# Patient Record
Sex: FEMALE | Race: WHITE | NOT HISPANIC OR LATINO | Employment: UNEMPLOYED | URBAN - METROPOLITAN AREA
[De-identification: names, ages, dates, MRNs, and addresses within clinical notes are randomized per-mention and may not be internally consistent; named-entity substitution may affect disease eponyms.]

---

## 2018-06-29 ENCOUNTER — HOSPITAL ENCOUNTER (EMERGENCY)
Facility: HOSPITAL | Age: 33
Discharge: HOME | End: 2018-06-29
Attending: EMERGENCY MEDICINE

## 2018-06-29 VITALS
SYSTOLIC BLOOD PRESSURE: 106 MMHG | HEIGHT: 64 IN | OXYGEN SATURATION: 96 % | TEMPERATURE: 98 F | HEART RATE: 53 BPM | RESPIRATION RATE: 18 BRPM | BODY MASS INDEX: 21.34 KG/M2 | DIASTOLIC BLOOD PRESSURE: 60 MMHG | WEIGHT: 125 LBS

## 2018-06-29 DIAGNOSIS — R10.10 UPPER ABDOMINAL PAIN: Primary | ICD-10-CM

## 2018-06-29 DIAGNOSIS — E86.0 DEHYDRATION: ICD-10-CM

## 2018-06-29 LAB
ALBUMIN SERPL BCP-MCNC: 4.3 G/DL
ALP SERPL-CCNC: 76 U/L
ALT SERPL W/O P-5'-P-CCNC: 33 U/L
ANION GAP SERPL CALC-SCNC: 11 MMOL/L
AST SERPL-CCNC: 34 U/L
B-HCG UR QL: NEGATIVE
BASOPHILS # BLD AUTO: 0.03 K/UL
BASOPHILS NFR BLD: 0.3 %
BILIRUB SERPL-MCNC: 0.4 MG/DL
BILIRUB UR QL STRIP: NEGATIVE
BUN SERPL-MCNC: 11 MG/DL
CALCIUM SERPL-MCNC: 9.7 MG/DL
CHLORIDE SERPL-SCNC: 103 MMOL/L
CLARITY UR: CLEAR
CO2 SERPL-SCNC: 25 MMOL/L
COLOR UR: YELLOW
CREAT SERPL-MCNC: 0.8 MG/DL
CTP QC/QA: YES
DIFFERENTIAL METHOD: NORMAL
EOSINOPHIL # BLD AUTO: 0.1 K/UL
EOSINOPHIL NFR BLD: 0.5 %
ERYTHROCYTE [DISTWIDTH] IN BLOOD BY AUTOMATED COUNT: 13.3 %
EST. GFR  (AFRICAN AMERICAN): >60 ML/MIN/1.73 M^2
EST. GFR  (NON AFRICAN AMERICAN): >60 ML/MIN/1.73 M^2
GLUCOSE SERPL-MCNC: 86 MG/DL
GLUCOSE UR QL STRIP: NEGATIVE
HCT VFR BLD AUTO: 39.5 %
HGB BLD-MCNC: 13.5 G/DL
HGB UR QL STRIP: NEGATIVE
KETONES UR QL STRIP: ABNORMAL
LEUKOCYTE ESTERASE UR QL STRIP: NEGATIVE
LIPASE SERPL-CCNC: 22 U/L
LYMPHOCYTES # BLD AUTO: 3.1 K/UL
LYMPHOCYTES NFR BLD: 32.8 %
MCH RBC QN AUTO: 30.1 PG
MCHC RBC AUTO-ENTMCNC: 34.2 G/DL
MCV RBC AUTO: 88 FL
MONOCYTES # BLD AUTO: 1 K/UL
MONOCYTES NFR BLD: 10.7 %
NEUTROPHILS # BLD AUTO: 5.2 K/UL
NEUTROPHILS NFR BLD: 55.6 %
NITRITE UR QL STRIP: NEGATIVE
PH UR STRIP: 6 [PH] (ref 5–8)
PLATELET # BLD AUTO: 301 K/UL
PMV BLD AUTO: 10.9 FL
POTASSIUM SERPL-SCNC: 3.8 MMOL/L
PROT SERPL-MCNC: 7.4 G/DL
PROT UR QL STRIP: NEGATIVE
RBC # BLD AUTO: 4.48 M/UL
SODIUM SERPL-SCNC: 139 MMOL/L
SP GR UR STRIP: 1.02 (ref 1–1.03)
URN SPEC COLLECT METH UR: ABNORMAL
UROBILINOGEN UR STRIP-ACNC: NEGATIVE EU/DL
WBC # BLD AUTO: 9.44 K/UL

## 2018-06-29 PROCEDURE — 96361 HYDRATE IV INFUSION ADD-ON: CPT

## 2018-06-29 PROCEDURE — 80053 COMPREHEN METABOLIC PANEL: CPT

## 2018-06-29 PROCEDURE — 85025 COMPLETE CBC W/AUTO DIFF WBC: CPT

## 2018-06-29 PROCEDURE — 96365 THER/PROPH/DIAG IV INF INIT: CPT

## 2018-06-29 PROCEDURE — 83690 ASSAY OF LIPASE: CPT

## 2018-06-29 PROCEDURE — 99283 EMERGENCY DEPT VISIT LOW MDM: CPT | Mod: 25

## 2018-06-29 PROCEDURE — 81003 URINALYSIS AUTO W/O SCOPE: CPT

## 2018-06-29 PROCEDURE — 81025 URINE PREGNANCY TEST: CPT | Performed by: PHYSICIAN ASSISTANT

## 2018-06-29 PROCEDURE — 25000003 PHARM REV CODE 250: Performed by: PHYSICIAN ASSISTANT

## 2018-06-29 PROCEDURE — C9113 INJ PANTOPRAZOLE SODIUM, VIA: HCPCS | Performed by: PHYSICIAN ASSISTANT

## 2018-06-29 PROCEDURE — 63600175 PHARM REV CODE 636 W HCPCS: Performed by: PHYSICIAN ASSISTANT

## 2018-06-29 RX ORDER — ONDANSETRON 4 MG/1
4 TABLET, ORALLY DISINTEGRATING ORAL EVERY 6 HOURS PRN
Qty: 20 TABLET | Refills: 0 | Status: ON HOLD | OUTPATIENT
Start: 2018-06-29 | End: 2020-07-08 | Stop reason: HOSPADM

## 2018-06-29 RX ORDER — FAMOTIDINE 20 MG/1
20 TABLET, FILM COATED ORAL 2 TIMES DAILY
Qty: 60 TABLET | Refills: 0 | Status: ON HOLD | OUTPATIENT
Start: 2018-06-29 | End: 2020-07-08 | Stop reason: HOSPADM

## 2018-06-29 RX ADMIN — DEXTROSE 40 MG: 50 INJECTION, SOLUTION INTRAVENOUS at 06:06

## 2018-06-29 RX ADMIN — SODIUM CHLORIDE 1000 ML: 0.9 INJECTION, SOLUTION INTRAVENOUS at 05:06

## 2018-06-29 RX ADMIN — SODIUM CHLORIDE 1000 ML: 0.9 INJECTION, SOLUTION INTRAVENOUS at 03:06

## 2018-06-29 NOTE — ED TRIAGE NOTES
Pt states she was out drinking last night and believes she has alcohol poisoning. She feels like she is moving, even though she is being still, like sea sickness. Reports having chest pains earlier today and feels shaky. Complains of headache, nausea, vomiting and stomach ache. Denies diarrhea.

## 2018-06-29 NOTE — DISCHARGE INSTRUCTIONS
Drink lots of fluids, stay well hydrated. Follow-up with your primary for reevaluation. Return to this ED if unable to tolerate by mouth intake, if pain worsens, if any other problems occur.

## 2018-07-01 NOTE — ED PROVIDER NOTES
"Encounter Date: 6/29/2018       History     Chief Complaint   Patient presents with    Alcohol Intoxication     "i think I got alcohol poisoning. I started drinking yesterday. [place] saphire gin." complains of right arm numbness     32-year-old female past medical history anxiety presents to ED complaining of suspicion for alcohol intoxication.  Patient states she drank excessively yesterday.  She woke this morning complaining of feeling jittery, lightheaded. Denies CP, palpitations, or SOB.  She admits to mild upper abdominal pain, she also endorses nausea with multiple episodes nonbloody, nonbilious emesis.  Patient denies fever or chills.  She denies change in bowel or bladder habits.  No urinary complaints.  No vaginal complaints. Symptoms constant. No alleviating/exacerbating factors. No radiation.           Review of patient's allergies indicates:   Allergen Reactions    Risperidone analogues Other (See Comments)     Blurred vision and drooling.     Past Medical History:   Diagnosis Date    Anxiety     Ectopic pregnancy     x2    PID (acute pelvic inflammatory disease) age 16     Past Surgical History:   Procedure Laterality Date    EYE SURGERY      LAPAROSCOPY-DIAGNOSTIC  April 4,2014    Ruptured left ectopic- left salpingectomy with lysis of adhesions    Left salpingectomy  April 4,2014    Left ruptured ectopic pregnancy    Right salpingectomy  age 21     Right Laparoscopic salpingectomy for ectopic    TUBAL LIGATION      Bilateral Salpingectomy for Ectopic     Family History   Problem Relation Age of Onset    Diabetes Maternal Grandmother     Stroke Maternal Grandmother     Cervical cancer Mother     Hypertension Father     Ovarian cancer Neg Hx     Colon cancer Neg Hx     Breast cancer Neg Hx      Social History   Substance Use Topics    Smoking status: Current Every Day Smoker     Packs/day: 1.00     Years: 12.00     Types: Cigarettes    Smokeless tobacco: Never Used    Alcohol use 0.0 " oz/week      Comment: occasional     Review of Systems   Constitutional: Negative for fever.   HENT: Negative for sore throat.    Eyes: Negative.    Respiratory: Negative for chest tightness and shortness of breath.    Cardiovascular: Negative for chest pain and palpitations.   Gastrointestinal: Positive for abdominal pain, nausea and vomiting. Negative for constipation and diarrhea.   Endocrine: Negative.    Genitourinary: Negative for dysuria.   Musculoskeletal: Negative for back pain, neck pain and neck stiffness.   Skin: Negative for rash.   Neurological: Positive for tremors and light-headedness. Negative for dizziness, syncope, weakness and headaches.   Hematological: Does not bruise/bleed easily.   Psychiatric/Behavioral: Negative.    All other systems reviewed and are negative.      Physical Exam     Initial Vitals [06/29/18 1430]   BP Pulse Resp Temp SpO2   (!) 144/83 65 20 98.7 °F (37.1 °C) 98 %      MAP       --         Physical Exam    Nursing note and vitals reviewed.  Constitutional: She appears well-developed and well-nourished. She is not diaphoretic. No distress.   HENT:   Head: Normocephalic and atraumatic.   Eyes: Conjunctivae and EOM are normal. Pupils are equal, round, and reactive to light.   Neck: Normal range of motion. Neck supple. No tracheal deviation present.   Cardiovascular: Intact distal pulses.   Pulmonary/Chest: Breath sounds normal. No stridor. No respiratory distress. She has no wheezes.   Abdominal:   Abdomen overall soft, normal bowel sounds ×4. Mild ttp midepigastric region.  No rebound or guarding.  No palpable mass or distention.  No flank or CVA tenderness.  Negative Gupta sign.  No pain over McBurney's point.   Musculoskeletal: Normal range of motion. She exhibits no tenderness.   Lymphadenopathy:     She has no cervical adenopathy.   Neurological: She is alert and oriented to person, place, and time.   Skin: Skin is warm and dry. Capillary refill takes less than 2 seconds.    Psychiatric: She has a normal mood and affect. Her behavior is normal. Judgment and thought content normal.         ED Course   Procedures  Labs Reviewed   URINALYSIS, REFLEX TO URINE CULTURE - Abnormal; Notable for the following:        Result Value    Ketones, UA 1+ (*)     All other components within normal limits    Narrative:     Preferred Collection Type->Urine, Clean Catch   CBC W/ AUTO DIFFERENTIAL   COMPREHENSIVE METABOLIC PANEL   LIPASE   POCT URINE PREGNANCY          Imaging Results    None          Medical Decision Making:   Differential Diagnosis:   Dehydration, gastritis, PUD  ED Management:  32-year-old female with excessive alcohol consumption yesterday, no lightheadedness, feeling jittery, midepigastric abdominal pain.  Labwork unremarkable.  Belly exam with mild tenderness to midepigastric region.  No rebound or guarding.  No peritoneal signs.  Normal bowel sounds x4.  Low suspicion for acute surgical abdomen. Feels better after fluids, protonix. 1+ketones, may indicate mild dehydration. Vitals reassuring. Will d/c with supportive measures, return precautions given.    Other:   I have discussed this case with another health care provider.       <> Summary of the Discussion: Dr. Coley                      Clinical Impression:   The primary encounter diagnosis was Upper abdominal pain. A diagnosis of Dehydration was also pertinent to this visit.      Disposition:   Disposition: Discharged  Condition: Stable                        Konstantin Pittman PA-C  07/01/18 1340

## 2020-05-30 ENCOUNTER — HOSPITAL ENCOUNTER (EMERGENCY)
Facility: OTHER | Age: 35
Discharge: HOME | End: 2020-05-30
Attending: EMERGENCY MEDICINE
Payer: MEDICAID

## 2020-05-30 VITALS
SYSTOLIC BLOOD PRESSURE: 108 MMHG | RESPIRATION RATE: 18 BRPM | BODY MASS INDEX: 21.34 KG/M2 | TEMPERATURE: 98 F | DIASTOLIC BLOOD PRESSURE: 64 MMHG | OXYGEN SATURATION: 97 % | HEART RATE: 92 BPM | HEIGHT: 64 IN | WEIGHT: 125 LBS

## 2020-05-30 DIAGNOSIS — S51.011A LACERATION OF RIGHT ELBOW: ICD-10-CM

## 2020-05-30 DIAGNOSIS — S51.011A LACERATION OF RIGHT ELBOW, INITIAL ENCOUNTER: Primary | ICD-10-CM

## 2020-05-30 PROCEDURE — 99283 EMERGENCY DEPT VISIT LOW MDM: CPT | Mod: 25

## 2020-05-30 PROCEDURE — 25000003 PHARM REV CODE 250: Performed by: EMERGENCY MEDICINE

## 2020-05-30 PROCEDURE — 12001 RPR S/N/AX/GEN/TRNK 2.5CM/<: CPT

## 2020-05-30 RX ORDER — LIDOCAINE HYDROCHLORIDE AND EPINEPHRINE 10; 10 MG/ML; UG/ML
10 INJECTION, SOLUTION INFILTRATION; PERINEURAL
Status: COMPLETED | OUTPATIENT
Start: 2020-05-30 | End: 2020-05-30

## 2020-05-30 RX ORDER — IBUPROFEN 600 MG/1
600 TABLET ORAL EVERY 6 HOURS PRN
Qty: 30 TABLET | Refills: 0 | Status: ON HOLD | OUTPATIENT
Start: 2020-05-30 | End: 2020-07-08 | Stop reason: HOSPADM

## 2020-05-30 RX ORDER — ACETAMINOPHEN 500 MG
1000 TABLET ORAL
Status: DISCONTINUED | OUTPATIENT
Start: 2020-05-30 | End: 2020-05-30 | Stop reason: HOSPADM

## 2020-05-30 RX ADMIN — LIDOCAINE HYDROCHLORIDE,EPINEPHRINE BITARTRATE 10 ML: 10; .01 INJECTION, SOLUTION INFILTRATION; PERINEURAL at 03:05

## 2020-05-30 RX ADMIN — BACITRACIN, NEOMYCIN, POLYMYXIN B 1 EACH: 400; 3.5; 5 OINTMENT TOPICAL at 04:05
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
## 2020-06-06 ENCOUNTER — HOSPITAL ENCOUNTER (EMERGENCY)
Facility: OTHER | Age: 35
Discharge: HOME | End: 2020-06-06
Attending: EMERGENCY MEDICINE
Payer: MEDICAID

## 2020-06-06 VITALS
TEMPERATURE: 99 F | DIASTOLIC BLOOD PRESSURE: 62 MMHG | RESPIRATION RATE: 18 BRPM | HEIGHT: 64 IN | WEIGHT: 126 LBS | BODY MASS INDEX: 21.51 KG/M2 | OXYGEN SATURATION: 100 % | HEART RATE: 63 BPM | SYSTOLIC BLOOD PRESSURE: 114 MMHG

## 2020-06-06 DIAGNOSIS — Z48.02 ENCOUNTER FOR REMOVAL OF SUTURES: Primary | ICD-10-CM

## 2020-06-06 PROCEDURE — 99281 EMR DPT VST MAYX REQ PHY/QHP: CPT

## 2020-06-06 NOTE — ED PROVIDER NOTES
Encounter Date: 6/6/2020       History     Chief Complaint   Patient presents with    Suture / Staple Removal     here for suture removal to R arm. pt denies any complications     Patient is a 34-year-old female who presents to the emergency department for suture removal.  Patient had sutures placed above right elbow on 5/30.  She states he became warm and swollen a couple of days ago and she went to another ER.  She states she was given an antibiotic but did not feel this.  She states the redness and swelling has improved.  She denies drainage, fever, difficulty ranging her elbow.    The history is provided by the patient.     Review of patient's allergies indicates:   Allergen Reactions    Risperidone analogues Other (See Comments)     Blurred vision and drooling.     Past Medical History:   Diagnosis Date    Anxiety     Ectopic pregnancy     x2    Genital herpes     PID (acute pelvic inflammatory disease) age 16     Past Surgical History:   Procedure Laterality Date    EYE SURGERY      LAPAROSCOPY-DIAGNOSTIC  April 4,2014    Ruptured left ectopic- left salpingectomy with lysis of adhesions    Left salpingectomy  April 4,2014    Left ruptured ectopic pregnancy    Right salpingectomy  age 21     Right Laparoscopic salpingectomy for ectopic    TUBAL LIGATION      Bilateral Salpingectomy for Ectopic     Family History   Problem Relation Age of Onset    Diabetes Maternal Grandmother     Stroke Maternal Grandmother     Cervical cancer Mother     Hypertension Father     Ovarian cancer Neg Hx     Colon cancer Neg Hx     Breast cancer Neg Hx      Social History     Tobacco Use    Smoking status: Current Every Day Smoker     Packs/day: 0.50     Years: 12.00     Pack years: 6.00     Types: Cigarettes    Smokeless tobacco: Never Used   Substance Use Topics    Alcohol use: Yes     Alcohol/week: 0.0 standard drinks     Comment: occasional    Drug use: No     Review of Systems   Constitutional: Negative  for chills and fever.   Musculoskeletal: Negative for arthralgias and joint swelling.   Skin: Positive for wound. Negative for color change and pallor.   Allergic/Immunologic: Negative for immunocompromised state.   Neurological: Negative for weakness and numbness.       Physical Exam     Initial Vitals [06/06/20 1723]   BP Pulse Resp Temp SpO2   114/62 63 18 98.5 °F (36.9 °C) 100 %      MAP       --         Physical Exam    Constitutional: Vital signs are normal. She is cooperative. No distress.   HENT:   Head: Normocephalic and atraumatic.   Eyes: Conjunctivae and EOM are normal.   Neck: Normal range of motion. Neck supple.   Cardiovascular: Normal rate, regular rhythm and intact distal pulses.   Musculoskeletal:   Right elbow has normal range of motion.  No overlying skin changes   Neurological: She is alert and oriented to person, place, and time. GCS eye subscore is 4. GCS verbal subscore is 5. GCS motor subscore is 6.   Skin: Skin is warm and dry. Capillary refill takes less than 2 seconds. No rash noted.   Four sutures intact posterior to the right elbow.  Wound is clean and dry with no overlying erythema or drainage.  No tenderness.         ED Course   Suture Removal  Date/Time: 6/6/2020 5:25 PM  Performed by: Ignacio Frederick PA-C  Authorized by: Malcolm Alford II, MD   Body area: upper extremity  Location details: right elbow  Wound Appearance: clean, well healed, normal color and no drainage  Sutures Removed: 4  Post-removal: Steri-Strips applied  Facility: sutures placed in this facility  Patient tolerance: Patient tolerated the procedure well with no immediate complications        Labs Reviewed - No data to display       Imaging Results    None          Medical Decision Making:   Initial Assessment:   Urgent evaluation of a 34 y.o. female presenting to the emergency department for suture removal. Patient is afebrile, nontoxic appearing and hemodynamically stable.  Wound is healing well.  There is no  signs of cellulitis.  ED Management:  Middle of wound had slight gaping so Steri-Strips were applied.  Patient advised wound care.  She had no other complaints today and was stable at discharge                                 Clinical Impression:     1. Encounter for removal of sutures                               Ignacio Frederick PA-C  06/06/20 4160

## 2020-07-04 ENCOUNTER — HOSPITAL ENCOUNTER (EMERGENCY)
Facility: OTHER | Age: 35
Discharge: PSYCHIATRIC OR SUBSTANCE USE FACILITY | End: 2020-07-04
Attending: EMERGENCY MEDICINE
Payer: MEDICAID

## 2020-07-04 VITALS
DIASTOLIC BLOOD PRESSURE: 85 MMHG | RESPIRATION RATE: 18 BRPM | OXYGEN SATURATION: 97 % | SYSTOLIC BLOOD PRESSURE: 129 MMHG | BODY MASS INDEX: 22.2 KG/M2 | WEIGHT: 130 LBS | HEIGHT: 64 IN | HEART RATE: 69 BPM | TEMPERATURE: 99 F

## 2020-07-04 DIAGNOSIS — R45.851 SUICIDAL IDEATION: Primary | ICD-10-CM

## 2020-07-04 DIAGNOSIS — R44.0 AUDITORY HALLUCINATIONS: ICD-10-CM

## 2020-07-04 DIAGNOSIS — Z86.59 HISTORY OF BORDERLINE PERSONALITY DISORDER: ICD-10-CM

## 2020-07-04 PROBLEM — F29 PSYCHOSIS: Status: ACTIVE | Noted: 2020-07-04

## 2020-07-04 LAB
ALBUMIN SERPL BCP-MCNC: 4.1 G/DL (ref 3.5–5.2)
ALP SERPL-CCNC: 67 U/L (ref 55–135)
ALT SERPL W/O P-5'-P-CCNC: 12 U/L (ref 10–44)
AMPHET+METHAMPHET UR QL: NEGATIVE
ANION GAP SERPL CALC-SCNC: 11 MMOL/L (ref 8–16)
APAP SERPL-MCNC: <3 UG/ML (ref 10–20)
AST SERPL-CCNC: 16 U/L (ref 10–40)
B-HCG UR QL: NEGATIVE
BARBITURATES UR QL SCN>200 NG/ML: NEGATIVE
BASOPHILS # BLD AUTO: 0.05 K/UL (ref 0–0.2)
BASOPHILS NFR BLD: 0.8 % (ref 0–1.9)
BENZODIAZ UR QL SCN>200 NG/ML: NEGATIVE
BILIRUB SERPL-MCNC: 0.3 MG/DL (ref 0.1–1)
BILIRUB UR QL STRIP: NEGATIVE
BUN SERPL-MCNC: 11 MG/DL (ref 6–20)
BZE UR QL SCN: NEGATIVE
CALCIUM SERPL-MCNC: 9 MG/DL (ref 8.7–10.5)
CANNABINOIDS UR QL SCN: NEGATIVE
CHLORIDE SERPL-SCNC: 106 MMOL/L (ref 95–110)
CLARITY UR: ABNORMAL
CO2 SERPL-SCNC: 23 MMOL/L (ref 23–29)
COLOR UR: YELLOW
CREAT SERPL-MCNC: 0.7 MG/DL (ref 0.5–1.4)
CREAT UR-MCNC: 121.7 MG/DL (ref 15–325)
CTP QC/QA: YES
DIFFERENTIAL METHOD: NORMAL
EOSINOPHIL # BLD AUTO: 0.1 K/UL (ref 0–0.5)
EOSINOPHIL NFR BLD: 1.2 % (ref 0–8)
ERYTHROCYTE [DISTWIDTH] IN BLOOD BY AUTOMATED COUNT: 13.2 % (ref 11.5–14.5)
EST. GFR  (AFRICAN AMERICAN): >60 ML/MIN/1.73 M^2
EST. GFR  (NON AFRICAN AMERICAN): >60 ML/MIN/1.73 M^2
ETHANOL SERPL-MCNC: <10 MG/DL
GLUCOSE SERPL-MCNC: 70 MG/DL (ref 70–110)
GLUCOSE UR QL STRIP: NEGATIVE
HCT VFR BLD AUTO: 40 % (ref 37–48.5)
HGB BLD-MCNC: 13.3 G/DL (ref 12–16)
HGB UR QL STRIP: ABNORMAL
IMM GRANULOCYTES # BLD AUTO: 0.01 K/UL (ref 0–0.04)
IMM GRANULOCYTES NFR BLD AUTO: 0.2 % (ref 0–0.5)
KETONES UR QL STRIP: NEGATIVE
LEUKOCYTE ESTERASE UR QL STRIP: NEGATIVE
LYMPHOCYTES # BLD AUTO: 2.3 K/UL (ref 1–4.8)
LYMPHOCYTES NFR BLD: 37.7 % (ref 18–48)
MCH RBC QN AUTO: 30.4 PG (ref 27–31)
MCHC RBC AUTO-ENTMCNC: 33.3 G/DL (ref 32–36)
MCV RBC AUTO: 92 FL (ref 82–98)
METHADONE UR QL SCN>300 NG/ML: NEGATIVE
MONOCYTES # BLD AUTO: 0.6 K/UL (ref 0.3–1)
MONOCYTES NFR BLD: 9.7 % (ref 4–15)
NEUTROPHILS # BLD AUTO: 3 K/UL (ref 1.8–7.7)
NEUTROPHILS NFR BLD: 50.4 % (ref 38–73)
NITRITE UR QL STRIP: NEGATIVE
NRBC BLD-RTO: 0 /100 WBC
OPIATES UR QL SCN: NEGATIVE
PCP UR QL SCN>25 NG/ML: NEGATIVE
PH UR STRIP: 7 [PH] (ref 5–8)
PLATELET # BLD AUTO: 267 K/UL (ref 150–350)
PMV BLD AUTO: 10.1 FL (ref 9.2–12.9)
POTASSIUM SERPL-SCNC: 3.8 MMOL/L (ref 3.5–5.1)
PROT SERPL-MCNC: 6.9 G/DL (ref 6–8.4)
PROT UR QL STRIP: NEGATIVE
RBC # BLD AUTO: 4.37 M/UL (ref 4–5.4)
SARS-COV-2 RDRP RESP QL NAA+PROBE: NEGATIVE
SODIUM SERPL-SCNC: 140 MMOL/L (ref 136–145)
SP GR UR STRIP: 1.02 (ref 1–1.03)
TOXICOLOGY INFORMATION: NORMAL
TSH SERPL DL<=0.005 MIU/L-ACNC: 1.01 UIU/ML (ref 0.4–4)
URN SPEC COLLECT METH UR: ABNORMAL
UROBILINOGEN UR STRIP-ACNC: NEGATIVE EU/DL
WBC # BLD AUTO: 6 K/UL (ref 3.9–12.7)

## 2020-07-04 PROCEDURE — 80307 DRUG TEST PRSMV CHEM ANLYZR: CPT

## 2020-07-04 PROCEDURE — 84443 ASSAY THYROID STIM HORMONE: CPT

## 2020-07-04 PROCEDURE — 81003 URINALYSIS AUTO W/O SCOPE: CPT | Mod: 59

## 2020-07-04 PROCEDURE — 81025 URINE PREGNANCY TEST: CPT | Performed by: EMERGENCY MEDICINE

## 2020-07-04 PROCEDURE — 80053 COMPREHEN METABOLIC PANEL: CPT

## 2020-07-04 PROCEDURE — U0002 COVID-19 LAB TEST NON-CDC: HCPCS

## 2020-07-04 PROCEDURE — 85025 COMPLETE CBC W/AUTO DIFF WBC: CPT

## 2020-07-04 PROCEDURE — 80329 ANALGESICS NON-OPIOID 1 OR 2: CPT

## 2020-07-04 PROCEDURE — 99285 EMERGENCY DEPT VISIT HI MDM: CPT

## 2020-07-04 PROCEDURE — 80320 DRUG SCREEN QUANTALCOHOLS: CPT

## 2020-07-04 NOTE — ED NOTES
PEDRO Lange states okay for pt to have 10 minute phone call with friend, Luke.  Pt speaking with friend at present, remains calm and cooperative.

## 2020-07-04 NOTE — ED NOTES
Valuables: ( ticket # [id number])     Lighters x2   Yellow metal necklace with pendant   Stud earrings  Leather bracelet   Black pants  Bra  Black shirt  Green hat  White tennis shoes      ALL VALUABLES AND BELONGINGS GIVEN TO SECURITY

## 2020-07-04 NOTE — ED NOTES
Pt brought to room 6 by triage nurse. ED sitter Carmen at bedside for direct observation until further evaluation by provider. PEC precautions initiated until further evaluation. All harmful objects removed from room.

## 2020-07-04 NOTE — ED NOTES
EMTALA:    Pt transferred to [facility].   Sending provider: PEDRO Lange and Radha ABEL.   Receiving Provider: Dr. Smith  Pt left via [facility]

## 2020-07-04 NOTE — ED NOTES
Pt care assumed.  Pt rounding complete.  Pt calm and cooperative, NAD noted.  Restroom and comfort needs addressed.  Pt updated on plan of care.  ED Bhupinder dowell, at bedside for direct observation.  Will continue to monitor.   at bedside per pt request.
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
## 2021-05-04 ENCOUNTER — PATIENT MESSAGE (OUTPATIENT)
Dept: RESEARCH | Facility: HOSPITAL | Age: 36
End: 2021-05-04

## 2021-07-03 ENCOUNTER — HOSPITAL ENCOUNTER (EMERGENCY)
Facility: OTHER | Age: 36
Discharge: HOME | End: 2021-07-03
Attending: EMERGENCY MEDICINE
Payer: MEDICAID

## 2021-07-03 VITALS
TEMPERATURE: 99 F | HEIGHT: 64 IN | OXYGEN SATURATION: 95 % | BODY MASS INDEX: 22.88 KG/M2 | RESPIRATION RATE: 20 BRPM | HEART RATE: 60 BPM | SYSTOLIC BLOOD PRESSURE: 98 MMHG | WEIGHT: 134 LBS | DIASTOLIC BLOOD PRESSURE: 61 MMHG

## 2021-07-03 DIAGNOSIS — M54.50 ACUTE BILATERAL LOW BACK PAIN WITHOUT SCIATICA: Primary | ICD-10-CM

## 2021-07-03 LAB
B-HCG UR QL: NEGATIVE
BILIRUB UR QL STRIP: NEGATIVE
CLARITY UR: CLEAR
COLOR UR: YELLOW
CTP QC/QA: YES
GLUCOSE UR QL STRIP: NEGATIVE
HGB UR QL STRIP: NEGATIVE
KETONES UR QL STRIP: NEGATIVE
LEUKOCYTE ESTERASE UR QL STRIP: NEGATIVE
NITRITE UR QL STRIP: NEGATIVE
PH UR STRIP: 8.5 [PH] (ref 5–8)
PROT UR QL STRIP: ABNORMAL
SP GR UR STRIP: 1.01 (ref 1–1.03)
URN SPEC COLLECT METH UR: ABNORMAL
UROBILINOGEN UR STRIP-ACNC: NEGATIVE EU/DL

## 2021-07-03 PROCEDURE — 99284 EMERGENCY DEPT VISIT MOD MDM: CPT

## 2021-07-03 PROCEDURE — 81025 URINE PREGNANCY TEST: CPT | Performed by: EMERGENCY MEDICINE

## 2021-07-03 PROCEDURE — 25000003 PHARM REV CODE 250: Performed by: EMERGENCY MEDICINE

## 2021-07-03 PROCEDURE — 81003 URINALYSIS AUTO W/O SCOPE: CPT | Performed by: EMERGENCY MEDICINE

## 2021-07-03 RX ORDER — NAPROXEN 500 MG/1
500 TABLET ORAL 2 TIMES DAILY PRN
Qty: 60 TABLET | Refills: 0 | Status: SHIPPED | OUTPATIENT
Start: 2021-07-03

## 2021-07-03 RX ORDER — DICLOFENAC SODIUM 10 MG/G
2 GEL TOPICAL 3 TIMES DAILY PRN
Qty: 100 G | Refills: 0 | Status: SHIPPED | OUTPATIENT
Start: 2021-07-03

## 2021-07-03 RX ORDER — NAPROXEN 500 MG/1
500 TABLET ORAL
Status: COMPLETED | OUTPATIENT
Start: 2021-07-03 | End: 2021-07-03

## 2021-07-03 RX ORDER — LIDOCAINE 50 MG/G
PATCH TOPICAL
Qty: 30 PATCH | Refills: 0 | Status: SHIPPED | OUTPATIENT
Start: 2021-07-03

## 2021-07-03 RX ORDER — METHOCARBAMOL 750 MG/1
1500 TABLET, FILM COATED ORAL 3 TIMES DAILY PRN
Qty: 30 TABLET | Refills: 0 | Status: SHIPPED | OUTPATIENT
Start: 2021-07-03 | End: 2021-07-08

## 2021-07-03 RX ORDER — METHOCARBAMOL 750 MG/1
1500 TABLET, FILM COATED ORAL
Status: COMPLETED | OUTPATIENT
Start: 2021-07-03 | End: 2021-07-03

## 2021-07-03 RX ADMIN — NAPROXEN 500 MG: 500 TABLET ORAL at 07:07

## 2021-07-03 RX ADMIN — METHOCARBAMOL TABLETS 1500 MG: 750 TABLET, COATED ORAL at 07:07

## 2021-11-15 NOTE — ED NOTES
11/15/21        Cielo M Giovani  [address] [place] [id number]      Dear Ms. Matute:    Thank you for your interest in the Hepatology Program at [facility].    I have enclosed an itinerary of your up coming appointment. Please arrive at least 15 minutes prior to your appointment to register.  You will need to bring a photo ID and your insurance card(s) to this appointment.      Enter through the main entrance of the hospital and proceed to the end of the galleria.  Take the “Galleria Elevators” (on your right) to the 5th floor.  (We occupy the entire 5th floor). Please see enclosed map.    If you have any questions or concerns regarding your upcoming appointment, please do not hesitate to call me at [phone].    Sincerely:        Marcial   [facility]  [facility]           Pt rounding complete.  Pain 0/10.  Restroom and comfort needs addressed.  Pt updated on POC.  [place], at bedside for direct observation.  Will continue to monitor.